# Patient Record
Sex: MALE | URBAN - METROPOLITAN AREA
[De-identification: names, ages, dates, MRNs, and addresses within clinical notes are randomized per-mention and may not be internally consistent; named-entity substitution may affect disease eponyms.]

---

## 2018-03-26 ENCOUNTER — HOSPITAL ENCOUNTER (EMERGENCY)
Facility: HOSPITAL | Age: 28
Discharge: HOME | End: 2018-03-26
Attending: EMERGENCY MEDICINE

## 2018-03-26 ENCOUNTER — APPOINTMENT (EMERGENCY)
Dept: RADIOLOGY | Facility: HOSPITAL | Age: 28
End: 2018-03-26

## 2018-03-26 VITALS
HEIGHT: 71 IN | SYSTOLIC BLOOD PRESSURE: 150 MMHG | WEIGHT: 190 LBS | DIASTOLIC BLOOD PRESSURE: 70 MMHG | BODY MASS INDEX: 26.6 KG/M2 | RESPIRATION RATE: 18 BRPM | TEMPERATURE: 98 F | HEART RATE: 67 BPM | OXYGEN SATURATION: 97 %

## 2018-03-26 DIAGNOSIS — S62.346A CLOSED NONDISPLACED FRACTURE OF BASE OF FIFTH METACARPAL BONE OF RIGHT HAND, INITIAL ENCOUNTER: Primary | ICD-10-CM

## 2018-03-26 PROCEDURE — 2W3EX1Z IMMOBILIZATION OF RIGHT HAND USING SPLINT: ICD-10-PCS | Performed by: EMERGENCY MEDICINE

## 2018-03-26 PROCEDURE — 29125 APPL SHORT ARM SPLINT STATIC: CPT | Mod: RT | Performed by: NURSE PRACTITIONER

## 2018-03-26 PROCEDURE — 73110 X-RAY EXAM OF WRIST: CPT | Mod: RT

## 2018-03-26 PROCEDURE — 73130 X-RAY EXAM OF HAND: CPT | Mod: RT

## 2018-03-26 PROCEDURE — 99283 EMERGENCY DEPT VISIT LOW MDM: CPT | Mod: 25

## 2018-03-26 ASSESSMENT — ENCOUNTER SYMPTOMS
COLOR CHANGE: 0
BRUISES/BLEEDS EASILY: 0
WEAKNESS: 0
JOINT SWELLING: 1
WOUND: 0
NUMBNESS: 1
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
## 2018-07-13 ENCOUNTER — HOSPITAL ENCOUNTER (EMERGENCY)
Dept: HOSPITAL 14 - H.ER | Age: 28
Discharge: HOME | End: 2018-07-13
Payer: COMMERCIAL

## 2018-07-13 VITALS — TEMPERATURE: 98.5 F | OXYGEN SATURATION: 100 %

## 2018-07-13 VITALS — RESPIRATION RATE: 18 BRPM | HEART RATE: 82 BPM | DIASTOLIC BLOOD PRESSURE: 75 MMHG | SYSTOLIC BLOOD PRESSURE: 118 MMHG

## 2018-07-13 DIAGNOSIS — S62.316A: Primary | ICD-10-CM

## 2018-07-13 DIAGNOSIS — S80.02XA: ICD-10-CM
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
## 2018-07-13 LAB
ALBUMIN SERPL-MCNC: 4.4 G/DL (ref 3.5–5)
ALBUMIN/GLOB SERPL: 1.1 {RATIO} (ref 1–2.1)
ALT SERPL-CCNC: 84 U/L (ref 21–72)
APTT BLD: 26.4 SECONDS (ref 25.6–37.1)
AST SERPL-CCNC: 97 U/L (ref 17–59)
BASOPHILS # BLD AUTO: 0.2 K/UL (ref 0–0.2)
BASOPHILS NFR BLD: 1.2 % (ref 0–2)
BUN SERPL-MCNC: 6 MG/DL (ref 9–20)
CALCIUM SERPL-MCNC: 9.2 MG/DL (ref 8.4–10.2)
EOSINOPHIL # BLD AUTO: 0.2 K/UL (ref 0–0.7)
EOSINOPHIL NFR BLD: 1.6 % (ref 0–4)
ERYTHROCYTE [DISTWIDTH] IN BLOOD BY AUTOMATED COUNT: 14.1 % (ref 11.5–14.5)
GFR NON-AFRICAN AMERICAN: > 60
HGB BLD-MCNC: 14.3 G/DL (ref 12–18)
INR PPP: 1.1 (ref 0.9–1.2)
LYMPHOCYTES # BLD AUTO: 1.4 K/UL (ref 1–4.3)
LYMPHOCYTES NFR BLD AUTO: 11 % (ref 20–40)
MCH RBC QN AUTO: 32.2 PG (ref 27–31)
MCHC RBC AUTO-ENTMCNC: 33.5 G/DL (ref 33–37)
MCV RBC AUTO: 96 FL (ref 80–94)
MONOCYTES # BLD: 1.2 K/UL (ref 0–0.8)
MONOCYTES NFR BLD: 9.2 % (ref 0–10)
NEUTROPHILS # BLD: 9.7 K/UL (ref 1.8–7)
NEUTROPHILS NFR BLD AUTO: 77 % (ref 50–75)
NRBC BLD AUTO-RTO: 0.1 % (ref 0–0)
PLATELET # BLD: 261 K/UL (ref 130–400)
PMV BLD AUTO: 10.6 FL (ref 7.2–11.7)
PROTHROMBIN TIME: 12.5 SECONDS (ref 9.8–13.1)
RBC # BLD AUTO: 4.44 MIL/UL (ref 4.4–5.9)
WBC # BLD AUTO: 12.6 K/UL (ref 4.8–10.8)

## 2018-07-13 PROCEDURE — 85610 PROTHROMBIN TIME: CPT

## 2018-07-13 PROCEDURE — 85025 COMPLETE CBC W/AUTO DIFF WBC: CPT

## 2018-07-13 PROCEDURE — 99284 EMERGENCY DEPT VISIT MOD MDM: CPT

## 2018-07-13 PROCEDURE — 96374 THER/PROPH/DIAG INJ IV PUSH: CPT

## 2018-07-13 PROCEDURE — 73130 X-RAY EXAM OF HAND: CPT

## 2018-07-13 PROCEDURE — 86900 BLOOD TYPING SEROLOGIC ABO: CPT

## 2018-07-13 PROCEDURE — 70450 CT HEAD/BRAIN W/O DYE: CPT

## 2018-07-13 PROCEDURE — 74177 CT ABD & PELVIS W/CONTRAST: CPT

## 2018-07-13 PROCEDURE — 73630 X-RAY EXAM OF FOOT: CPT

## 2018-07-13 PROCEDURE — 86850 RBC ANTIBODY SCREEN: CPT

## 2018-07-13 PROCEDURE — 80053 COMPREHEN METABOLIC PANEL: CPT

## 2018-07-13 PROCEDURE — 96376 TX/PRO/DX INJ SAME DRUG ADON: CPT

## 2018-07-13 PROCEDURE — 72125 CT NECK SPINE W/O DYE: CPT

## 2018-07-13 PROCEDURE — 71260 CT THORAX DX C+: CPT

## 2018-07-13 PROCEDURE — 73562 X-RAY EXAM OF KNEE 3: CPT

## 2018-07-13 PROCEDURE — 85730 THROMBOPLASTIN TIME PARTIAL: CPT
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
## 2018-07-13 NOTE — ED PDOC
- Laboratory Results


Result Diagrams: 


 07/13/18 02:48





 07/13/18 02:48





- ECG


O2 Sat by Pulse Oximetry: 100 (RA)





Medical Decision Making


Medical Decision Making: 


Time: 0700


--Patient is endorsed to provider by Dr. Aleman, pending xray results and re-

evaluation.





--------------------------------------------------------------------------------

----------------------------------------


Scribe Attestation:


Documented by Sophia Aldridge, acting as a scribe for Maxime Brand III, DO.





Provider Scribe Attestation:


All medical record entries made by the Scribe were at my direction and 

personally dictated by me. I have reviewed the chart and agree that the record 

accurately reflects my personal performance of the history, physical exam, 

medical decision making, and the department course for this patient. I have 

also personally directed, reviewed, and agree with the discharge instructions 

and disposition.





Disposition





- Disposition


Forms:  [facility] (English)
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
## 2018-08-18 ENCOUNTER — HOSPITAL ENCOUNTER (EMERGENCY)
Dept: HOSPITAL 31 - C.ER | Age: 28
Discharge: HOME | End: 2018-08-18
Payer: COMMERCIAL

## 2018-08-18 VITALS — SYSTOLIC BLOOD PRESSURE: 130 MMHG | DIASTOLIC BLOOD PRESSURE: 68 MMHG | TEMPERATURE: 98 F | HEART RATE: 100 BPM

## 2018-08-18 VITALS — RESPIRATION RATE: 20 BRPM

## 2018-08-18 DIAGNOSIS — M79.672: ICD-10-CM

## 2018-08-18 DIAGNOSIS — S62.316A: Primary | ICD-10-CM

## 2018-08-18 DIAGNOSIS — M79.671: ICD-10-CM
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
## 2018-08-19 VITALS — OXYGEN SATURATION: 99 %

## 2020-03-06 ENCOUNTER — EMERGENCY (EMERGENCY)
Facility: HOSPITAL | Age: 30
LOS: 1 days | Discharge: HOME | End: 2020-03-06
Attending: EMERGENCY MEDICINE | Admitting: EMERGENCY MEDICINE
Payer: SELF-PAY

## 2020-03-06 VITALS
DIASTOLIC BLOOD PRESSURE: 65 MMHG | HEART RATE: 64 BPM | SYSTOLIC BLOOD PRESSURE: 109 MMHG | TEMPERATURE: 98 F | OXYGEN SATURATION: 97 % | RESPIRATION RATE: 18 BRPM

## 2020-03-06 VITALS
HEART RATE: 75 BPM | RESPIRATION RATE: 18 BRPM | DIASTOLIC BLOOD PRESSURE: 68 MMHG | SYSTOLIC BLOOD PRESSURE: 107 MMHG | OXYGEN SATURATION: 97 % | WEIGHT: 164.91 LBS | TEMPERATURE: 98 F

## 2020-03-06 DIAGNOSIS — R51 HEADACHE: ICD-10-CM

## 2020-03-06 DIAGNOSIS — R06.02 SHORTNESS OF BREATH: ICD-10-CM

## 2020-03-06 DIAGNOSIS — R55 SYNCOPE AND COLLAPSE: ICD-10-CM

## 2020-03-06 DIAGNOSIS — R11.2 NAUSEA WITH VOMITING, UNSPECIFIED: ICD-10-CM

## 2020-03-06 LAB
ALBUMIN SERPL ELPH-MCNC: 3.6 G/DL — SIGNIFICANT CHANGE UP (ref 3.4–5)
ALP SERPL-CCNC: 125 U/L — HIGH (ref 40–120)
ALT FLD-CCNC: 36 U/L — SIGNIFICANT CHANGE UP (ref 12–42)
ANION GAP SERPL CALC-SCNC: 5 MMOL/L — LOW (ref 9–16)
AST SERPL-CCNC: 19 U/L — SIGNIFICANT CHANGE UP (ref 15–37)
BILIRUB SERPL-MCNC: 0.4 MG/DL — SIGNIFICANT CHANGE UP (ref 0.2–1.2)
BUN SERPL-MCNC: 10 MG/DL — SIGNIFICANT CHANGE UP (ref 7–23)
CALCIUM SERPL-MCNC: 9.2 MG/DL — SIGNIFICANT CHANGE UP (ref 8.5–10.5)
CHLORIDE SERPL-SCNC: 99 MMOL/L — SIGNIFICANT CHANGE UP (ref 96–108)
CO2 SERPL-SCNC: 31 MMOL/L — SIGNIFICANT CHANGE UP (ref 22–31)
CREAT SERPL-MCNC: 0.93 MG/DL — SIGNIFICANT CHANGE UP (ref 0.5–1.3)
GLUCOSE SERPL-MCNC: 113 MG/DL — HIGH (ref 70–99)
HCT VFR BLD CALC: 40.6 % — SIGNIFICANT CHANGE UP (ref 39–50)
HGB BLD-MCNC: 14 G/DL — SIGNIFICANT CHANGE UP (ref 13–17)
MCHC RBC-ENTMCNC: 32.1 PG — SIGNIFICANT CHANGE UP (ref 27–34)
MCHC RBC-ENTMCNC: 34.5 GM/DL — SIGNIFICANT CHANGE UP (ref 32–36)
MCV RBC AUTO: 93.1 FL — SIGNIFICANT CHANGE UP (ref 80–100)
NRBC # BLD: 0 /100 WBCS — SIGNIFICANT CHANGE UP (ref 0–0)
PLATELET # BLD AUTO: 265 K/UL — SIGNIFICANT CHANGE UP (ref 150–400)
POTASSIUM SERPL-MCNC: 3.3 MMOL/L — LOW (ref 3.5–5.3)
POTASSIUM SERPL-SCNC: 3.3 MMOL/L — LOW (ref 3.5–5.3)
PROT SERPL-MCNC: 7.5 G/DL — SIGNIFICANT CHANGE UP (ref 6.4–8.2)
RBC # BLD: 4.36 M/UL — SIGNIFICANT CHANGE UP (ref 4.2–5.8)
RBC # FLD: 14.9 % — HIGH (ref 10.3–14.5)
SODIUM SERPL-SCNC: 135 MMOL/L — SIGNIFICANT CHANGE UP (ref 132–145)
WBC # BLD: 13.86 K/UL — HIGH (ref 3.8–10.5)
WBC # FLD AUTO: 13.86 K/UL — HIGH (ref 3.8–10.5)

## 2020-03-06 PROCEDURE — 99284 EMERGENCY DEPT VISIT MOD MDM: CPT

## 2020-03-06 RX ORDER — SODIUM CHLORIDE 9 MG/ML
1000 INJECTION INTRAMUSCULAR; INTRAVENOUS; SUBCUTANEOUS ONCE
Refills: 0 | Status: COMPLETED | OUTPATIENT
Start: 2020-03-06 | End: 2020-03-06

## 2020-03-06 RX ORDER — ONDANSETRON 8 MG/1
4 TABLET, FILM COATED ORAL ONCE
Refills: 0 | Status: COMPLETED | OUTPATIENT
Start: 2020-03-06 | End: 2020-03-06

## 2020-03-06 RX ADMIN — ONDANSETRON 4 MILLIGRAM(S): 8 TABLET, FILM COATED ORAL at 10:18

## 2020-03-06 RX ADMIN — SODIUM CHLORIDE 1000 MILLILITER(S): 9 INJECTION INTRAMUSCULAR; INTRAVENOUS; SUBCUTANEOUS at 10:18

## 2020-03-06 NOTE — ED PROVIDER NOTE - PATIENT PORTAL LINK FT
You can access the FollowMyHealth Patient Portal offered by [facility] by registering at the following website: [email or web address]. By joining [facility]’s FollowMyHealth portal, you will also be able to view your health information using other applications (apps) compatible with our system.
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
## 2020-03-06 NOTE — ED PROVIDER NOTE - NSFOLLOWUPINSTRUCTIONS_ED_ALL_ED_FT
Near-Syncope  Near-syncope is when you suddenly feel like you might pass out (faint), but you do not actually lose consciousness. This may also be referred to as presyncope. During an episode of near-syncope, you may:  Feel dizzy, weak, or light-headed.Feel nauseous.See all white or all black in your field of vision, or see spots.Have cold, clammy skin.This condition is caused by a sudden decrease in blood flow to the brain. This decrease can result from various causes, but most of those causes are not dangerous. However, near-syncope may be a sign of a serious medical problem, so it is important to seek medical care.  Follow these instructions at home:  Medicines     Take over-the-counter and prescription medicines only as told by your health care provider.If you are taking blood pressure or heart medicine, get up slowly and take several minutes to sit and then stand. This can reduce dizziness.General instructions     Pay attention to any changes in your symptoms.Talk with your health care provider about your symptoms. You may need to have testing to understand the cause of your near-syncope.If you start to feel like you might faint, lie down right away and raise (elevate) your feet above the level of your heart. Breathe deeply and steadily. Wait until all of the symptoms have passed.Have someone stay with you until you feel stable.Do not drive, use machinery, or play sports until your health care provider says it is okay.Drink enough fluid to keep your urine pale yellow.Keep all follow-up visits as told by your health care provider. This is important.Get help right away if you:  Have a seizure.Have unusual pain in your chest, abdomen, or back.Faint once or repeatedly.Have a severe headache.Are bleeding from your mouth or rectum, or you have black or tarry stool.Have a very fast or irregular heartbeat (palpitations).Are confused.Have trouble walking.Have severe weakness.Have vision problems.These symptoms may represent a serious problem that is an emergency. Do not wait to see if your symptoms will go away. Get medical help right away. Call your local emergency services (911 in the [place] Do not drive yourself to the hospital.   Summary  Near-syncope is when you suddenly feel like you might pass out (faint), but you do not actually lose consciousness.This condition is caused by a sudden decrease in blood flow to the brain. This decrease can result from various causes, but most of those causes are not dangerous.Near-syncope may be a sign of a serious medical problem, so it is important to seek medical care.This information is not intended to replace advice given to you by your health care provider. Make sure you discuss any questions you have with your health care provider.    Document Released: 12/18/2006 Document Revised: 11/06/2019 Document Reviewed: 11/06/2019  Elsevier Interactive Patient Education © 2020 [employer]

## 2020-03-06 NOTE — ED ADULT NURSE NOTE - CHIEF COMPLAINT QUOTE
[place] body aches afebrile denies recent trave, smoker, wheezing on ems arrival duoneb x 2 given pta

## 2020-03-06 NOTE — ED PROVIDER NOTE - CLINICAL SUMMARY MEDICAL DECISION MAKING FREE TEXT BOX
near syncope w asthma flare- no sob in ED- sx improved after ivf neb- d/c home n/v precautions explained to px

## 2020-03-06 NOTE — ED PROVIDER NOTE - CONSTITUTIONAL, MLM
normal... Well appearing, drowsy but alert, oriented to person, place, time/situation and in no apparent distress.

## 2020-05-29 NOTE — ED ATTESTATION NOTE
Requested Prescriptions     Pending Prescriptions Disp Refills    Cholecalciferol (VITAMIN D3) 50 MCG (2000 UT) TABS [Pharmacy Med Name: VIT D-3 2,000 UNIT 50MCG TB 50 MCG TAB] 30 tablet 5     Sig: TAKE 1 TABLET BY MOUTH DAILY   Patient requesting a medication refill.   Pharmacy: [facility]  Next office visit:  Last regular office visit: 9/13/19 The patient was evaluated and managed by the physician assistant / nurse practitioner.     Samara Pal MD  03/26/18 [id number]
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
